# Patient Record
Sex: MALE | Race: BLACK OR AFRICAN AMERICAN | NOT HISPANIC OR LATINO | Employment: FULL TIME | URBAN - METROPOLITAN AREA
[De-identification: names, ages, dates, MRNs, and addresses within clinical notes are randomized per-mention and may not be internally consistent; named-entity substitution may affect disease eponyms.]

---

## 2019-07-01 ENCOUNTER — TELEPHONE (OUTPATIENT)
Dept: ORTHOPEDIC SURGERY | Facility: CLINIC | Age: 29
End: 2019-07-01

## 2019-07-10 ENCOUNTER — OFFICE VISIT (OUTPATIENT)
Dept: ORTHOPEDIC SURGERY | Facility: CLINIC | Age: 29
End: 2019-07-10

## 2019-07-10 VITALS — WEIGHT: 185 LBS | HEIGHT: 71 IN | BODY MASS INDEX: 25.9 KG/M2 | TEMPERATURE: 98.3 F

## 2019-07-10 DIAGNOSIS — M25.561 ACUTE PAIN OF RIGHT KNEE: Primary | ICD-10-CM

## 2019-07-10 PROCEDURE — 73562 X-RAY EXAM OF KNEE 3: CPT | Performed by: ORTHOPAEDIC SURGERY

## 2019-07-10 PROCEDURE — 99203 OFFICE O/P NEW LOW 30 MIN: CPT | Performed by: ORTHOPAEDIC SURGERY

## 2019-07-10 RX ORDER — MELOXICAM 15 MG/1
15 TABLET ORAL DAILY PRN
Qty: 30 TABLET | Refills: 2 | Status: SHIPPED | OUTPATIENT
Start: 2019-07-10

## 2019-07-10 RX ORDER — IBUPROFEN 400 MG/1
400 TABLET ORAL EVERY 6 HOURS PRN
COMMUNITY

## 2019-07-10 RX ORDER — METHYLPREDNISOLONE 4 MG/1
TABLET ORAL
Qty: 21 TABLET | Refills: 0 | Status: SHIPPED | OUTPATIENT
Start: 2019-07-10

## 2019-07-10 NOTE — PROGRESS NOTES
Patient: Jaspreet Beard    YOB: 1990    Medical Record Number: [id number]    Chief Complaints: Right knee injury    History of Present Illness:     28 y.o. male patient who presents for his right knee.  He injured the knee while playing ultimate Frisbee on May 11.  He has continued to have severe intermittent stabbing pain along the medial aspect of his knee.  He gets occasional aching and grinding as well as swelling, clicking and popping.  He did see another physician and had an MRI ordered.  He has tried a couple of sessions of physical therapy which did not really seem to help.    Allergies: No Known Allergies    Home Medications:      Current Outpatient Medications:   •  ibuprofen (ADVIL,MOTRIN) 400 MG tablet, Take 400 mg by mouth Every 6 (Six) Hours As Needed for Mild Pain ., Disp: , Rfl:     History reviewed. No pertinent past medical history.    Past Surgical History:   Procedure Laterality Date   • APPENDECTOMY     • HERNIA REPAIR     • SHOULDER SURGERY Right 2010       Social History     Occupational History   • Not on file   Tobacco Use   • Smoking status: Never Smoker   • Smokeless tobacco: Never Used   Substance and Sexual Activity   • Alcohol use: Yes     Comment: 2 per week   • Drug use: No   • Sexual activity: Defer      Social History     Social History Narrative   • Not on file       History reviewed. No pertinent family history.    Review of Systems:      Constitutional: Denies fever, shaking or chills   Eyes: Denies change in visual acuity   HEENT: Denies nasal congestion or sore throat   Respiratory: Denies cough or shortness of breath   Cardiovascular: Denies chest pain or edema  Endocrine: Denies tremors, palpitations, intolerance of heat or cold, polyuria, polydipsia.  GI: Denies abdominal pain, nausea, vomiting, bloody stools or diarrhea  : Denies frequency, urgency, incontinence, retention, or nocturia.  Musculoskeletal: Denies numbness, tingling or loss of motor  "function except as above  Integument: Denies rash, lesion or ulceration   Neurologic: Denies headache or focal weakness, deficits  Heme: Denies spontaneous or excessive bleeding, epistaxis, hematuria, melena, fatigue, enlarged or tender lymph nodes.      All other pertinent positives and negatives as noted above in HPI.    Physical Exam: 28 y.o. male    Vitals:    07/10/19 0829   Temp: 98.3 °F (36.8 °C)   Weight: 83.9 kg (185 lb)   Height: 180.3 cm (71\")       General:  Patient is awake and alert.  Appears in no acute distress or discomfort.    Psych:  Affect and demeanor are appropriate.    Eyes:  Conjunctiva and sclera appear grossly normal.  Eyes track well and EOM seem to be intact.    Ears:  No gross abnormalities.  Hearing adequate for the exam.    Cardiovascular:  Regular rate and rhythm.    Lungs:  Good chest expansion.  Breathing unlabored.    Lymph:  No palpable masses or adenopathy in the affected extremity    Extremities: The right knee is examined.  Skin is benign.  No effusion.  Moderate tenderness over the medial facet of the patella and medial patellofemoral retinaculum.  He has discomfort with lateral translation of the patella but no apprehension or instability.  Good overall patellar mobility.  He has full knee motion but hyperflexion is uncomfortable for him.  The knee is stable to varus, valgus, Lachman's and posterior drawer.  Negative medial and lateral Jael's test.  He has good strength throughout the leg including hip flexion, knee extension, ankle and toe plantarflexion and dorsal flexion.  Sensation is intact.  Palpable pedal pulses.  Good skin turgor.  Brisk capillary refill.         Radiology:   AP, merchant and lateral views of the right knee are ordered and reviewed.  No comparison films are available.  I do not see any acute abnormalities, malalignment, lesions or masses.  On the merchant view, it appears that he has a bony overgrowth off of the medial facet of the patella.  This " does not appear to be acute.  This may be a benign projectional incidental finding.  MRI of the right knee is reviewed.  The patient brought in a copy of the report on his phone for me to review as well.  He has a defect in the articular cartilage of the medial facet of his patella.  This matches up exactly with where he is hurting clinically.  It looks like he has a chondral flap tear in that area and some inflammation.  I do not see any other acute abnormalities.  The menisci, ligamentous structures and articular cartilage otherwise appears benign and intact.    Assessment/Plan: Right knee patellar chondral injury    We discussed options for him.  I do not see an absolute indication for surgery on his MRI.  I recommend that we try to manage this conservatively.  We talked about a possible injection versus oral anti-inflammatories and further physical therapy as a starting point.  He debated the injection and ultimately elected for the oral anti-inflammatories.  I am giving him a prescription for a Dosepak and meloxicam to take as needed.  Risks of these medicines were discussed.  I am going to refer him back to physical therapy.  We are going to give it a month and see if he gets better.  I want to see him back after a month and reevaluate.  He if he is no better at that time, we may have to consider an arthroscopy or we could revisit the option of an injection at that time.  This was all discussed with the patient.  He understands and agrees.    Chris Goss MD    07/10/2019